# Patient Record
Sex: MALE | Race: NATIVE HAWAIIAN OR OTHER PACIFIC ISLANDER | NOT HISPANIC OR LATINO | Employment: OTHER | URBAN - METROPOLITAN AREA
[De-identification: names, ages, dates, MRNs, and addresses within clinical notes are randomized per-mention and may not be internally consistent; named-entity substitution may affect disease eponyms.]

---

## 2024-04-11 ENCOUNTER — MEDICAL CORRESPONDENCE (OUTPATIENT)
Dept: HEALTH INFORMATION MANAGEMENT | Facility: CLINIC | Age: 59
End: 2024-04-11

## 2024-04-12 ENCOUNTER — TRANSCRIBE ORDERS (OUTPATIENT)
Dept: OTHER | Age: 59
End: 2024-04-12

## 2024-04-12 DIAGNOSIS — M79.661 BILATERAL CALF PAIN: ICD-10-CM

## 2024-04-12 DIAGNOSIS — M79.662 BILATERAL CALF PAIN: ICD-10-CM

## 2024-04-12 DIAGNOSIS — R94.131 ABNORMAL EMG: Primary | ICD-10-CM

## 2024-07-09 ENCOUNTER — MEDICAL CORRESPONDENCE (OUTPATIENT)
Dept: HEALTH INFORMATION MANAGEMENT | Facility: CLINIC | Age: 59
End: 2024-07-09

## 2024-07-10 ENCOUNTER — TRANSCRIBE ORDERS (OUTPATIENT)
Dept: OTHER | Age: 59
End: 2024-07-10

## 2024-07-10 DIAGNOSIS — M21.379 FOOT DROP, UNSPECIFIED LATERALITY: ICD-10-CM

## 2024-07-10 DIAGNOSIS — M54.16 LUMBAR RADICULOPATHY: Primary | ICD-10-CM

## 2024-07-10 DIAGNOSIS — G57.30 PERONEAL NEUROPATHY: ICD-10-CM

## 2024-08-12 ENCOUNTER — OFFICE VISIT (OUTPATIENT)
Dept: NEUROLOGY | Facility: CLINIC | Age: 59
End: 2024-08-12
Payer: MEDICAID

## 2024-08-12 VITALS — DIASTOLIC BLOOD PRESSURE: 75 MMHG | HEART RATE: 76 BPM | SYSTOLIC BLOOD PRESSURE: 135 MMHG | OXYGEN SATURATION: 92 %

## 2024-08-12 DIAGNOSIS — M54.17 LUMBOSACRAL RADICULOPATHY AT L5: Primary | ICD-10-CM

## 2024-08-12 DIAGNOSIS — M54.16 LUMBAR RADICULOPATHY: ICD-10-CM

## 2024-08-12 PROBLEM — I10 HYPERTENSION: Status: ACTIVE | Noted: 2023-11-24

## 2024-08-12 PROBLEM — E11.8 CONTROLLED TYPE 2 DIABETES MELLITUS WITH COMPLICATION, WITH LONG-TERM CURRENT USE OF INSULIN (H): Status: ACTIVE | Noted: 2021-04-09

## 2024-08-12 PROBLEM — N18.1 CKD (CHRONIC KIDNEY DISEASE) STAGE 1, GFR 90 ML/MIN OR GREATER: Status: ACTIVE | Noted: 2024-01-17

## 2024-08-12 PROBLEM — M54.10 BILATERAL RADIATING LEG PAIN: Status: ACTIVE | Noted: 2023-11-22

## 2024-08-12 PROBLEM — Z79.4 CONTROLLED TYPE 2 DIABETES MELLITUS WITH COMPLICATION, WITH LONG-TERM CURRENT USE OF INSULIN (H): Status: ACTIVE | Noted: 2021-04-09

## 2024-08-12 PROBLEM — R80.9 PROTEINURIA: Status: ACTIVE | Noted: 2023-11-24

## 2024-08-12 PROCEDURE — 99205 OFFICE O/P NEW HI 60 MIN: CPT | Performed by: STUDENT IN AN ORGANIZED HEALTH CARE EDUCATION/TRAINING PROGRAM

## 2024-08-12 RX ORDER — HYDROCHLOROTHIAZIDE 25 MG/1
25 TABLET ORAL DAILY
COMMUNITY
Start: 2024-06-13

## 2024-08-12 RX ORDER — LOSARTAN POTASSIUM 50 MG/1
50 TABLET ORAL DAILY
COMMUNITY
Start: 2024-06-13

## 2024-08-12 RX ORDER — ACETAMINOPHEN 325 MG/1
325 TABLET ORAL ONCE
COMMUNITY

## 2024-08-12 RX ORDER — HYDROXYZINE PAMOATE 50 MG/1
50 CAPSULE ORAL 3 TIMES DAILY PRN
COMMUNITY
Start: 2023-08-18

## 2024-08-12 RX ORDER — ALBUTEROL SULFATE 90 UG/1
1 AEROSOL, METERED RESPIRATORY (INHALATION) EVERY 4 HOURS PRN
COMMUNITY
Start: 2024-02-23

## 2024-08-12 RX ORDER — LANCETS
EACH MISCELLANEOUS DAILY
COMMUNITY
Start: 2024-02-23

## 2024-08-12 RX ORDER — SEMAGLUTIDE 0.68 MG/ML
0.5 INJECTION, SOLUTION SUBCUTANEOUS
COMMUNITY
Start: 2023-06-15

## 2024-08-12 RX ORDER — ATORVASTATIN CALCIUM 20 MG/1
20 TABLET, FILM COATED ORAL DAILY
COMMUNITY
Start: 2024-06-13

## 2024-08-12 RX ORDER — IPRATROPIUM BROMIDE AND ALBUTEROL SULFATE 2.5; .5 MG/3ML; MG/3ML
1 SOLUTION RESPIRATORY (INHALATION) EVERY 4 HOURS PRN
COMMUNITY
Start: 2024-06-13

## 2024-08-12 ASSESSMENT — PATIENT HEALTH QUESTIONNAIRE - PHQ9: SUM OF ALL RESPONSES TO PHQ QUESTIONS 1-9: 13

## 2024-08-12 NOTE — LETTER
8/12/2024      Varun Godinez  [address]  [facility], [place] [address]      Dear Colleague,    Thank you for referring your patient, Varun Godinez, to the [facility]UROLOG[facility]. Please see a copy of my visit note below.    CHIEF COMPLAINT / REASON FOR VISIT  Bilateral leg pain    Referred by Dr. Carty (Neurology in [place])    HISTORY OF PRESENT ILLNESS   is a 58 year old male presenting [facility] for evaluation of bilateral leg pain. Patient is accompanied by his wife, who helped provide collateral history today.  He was evaluated by multiple neurologists locally.  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  He is here for second opinion.    He reports having intermittent episodes of shooting pain in the posterolateral calf, lateral thigh, and buttock for about 5 to 6 years.  Symptoms initially started in the right leg and eventually involved the left leg 6 months later (in a similar distribution).  The pain typically comes on with prolonged standing and walking. The pain is also present while lying down in certain position. Adjusting the position of his legs can make the pain go away.  Symptoms have become more frequent and more intense in the past few months.  Symptoms continue to be bilateral but worse on the right.  He also reports tingling sensation in the right leg (entire leg).  He denies any weakness.  He continues to be able to walk independently.  He has had epidural injections, which have helped decreasing the pain. However, the symptoms have not entirely gone away.  He occasionally takes Tylenol, which also helps.    Mr. Godinez denies history of significant weight loss.  His diabetes has been well-controlled.  There has been no significant change in his diabetic treatment.    I have reviewed prior investigations as listed below.  - EMG 11/22/2023, [facility] (Tabulated data from EMG report were personally  reviewed and independently interpreted): Right sural snap was 9.9  V.  Right superficial peroneal SNAP was absent.  Right peroneal CMAP was 4.1 mV with mild slowing of CV (39 m/s). Stimulation at below fibula head was not performed; no clear conduction block between ankle and pop ousmane.  Left tibial CMAP was 1.4 and right tibial CMAP was 3.7 mV (slight asymmetry).  Needle EMG in the right leg showed large motor unit potentials in right proximal and distal L5 innervated muscles, left tibialis anterior as well as right lumbar paraspinal.  Fibrillation potentials were noted in bilateral tibialis anterior and right lumbar paraspinal muscles.  Findings are consistent with chronic active bilateral L5 radiculopathies.  - Repeat EMG (no nerve conduction) 4/8/2024 showed fibrillation potentials and bilateral tibialis anterior and bilateral peroneus longus muscles.  -MRI L spine 1/17/2024:  Degenerative changes most notable at L4-L5 with mild/moderate neural foraminal stenosis bilaterally at this level.   - MRI right knee 3/15/2024:   1.  Common peroneal nerve is unremarkable by MRI appearance.   2.  Intrasubstance degenerative change in the posterior horn of the medial meniscus without tear.   3.  Moderate fissuring of the articular cartilage in the caudal aspect the mid trochlear groove.   4.  Mild tendinosis of the patellar tendon.   - MRI right hip 3/15/2024:  1.  Mild degenerative tearing in the anterior superior aspect of the right hip labrum.   2.  Moderate thinning of the articular cartilage of the right hip.   3.  No MR evidence of trochanteric bursitis of the right hip.   - MRI right leg 3/15/2024: Mild edema in the distal aspect of the gastrocnemius musculature which could represent mild strain in the appropriate clinical setting. Otherwise unremarkable exam.     REVIEW OF SYSTEMS  All negative except for what indicated in the HPI. The following portions of the patient's history were reviewed and updated as  appropriate: allergies, current medications, family history, medical history, surgical history, social history, and problem list.     PAST MEDICAL/SURGICAL HISTORY   Diabetes, hypertension, hyperlipidemia, obstructive sleep apnea.    PHYSICAL EXAM  /75   Pulse 76   SpO2 92%     NEUROLOGICAL EXAMINATION  Mental status: normal.  Cranial nerves: normal.  Muscle strength: Very mild weakness in bilateral gluteus medius and left tibialis posterior. Otherwise, normal muscle strength 5/5 proximally and distally in upper and lower limbs.  Sensation: Minimally decreased sensation to light touch, pinprick, cold temperature in the lateral>medial sides of distal legs and dorsum of both feet. Absent vibration sense in the toes.   Deep tendon reflexes: Normal reflexes in the upper limbs and patella. Absent bilateral ankle reflexes  High arched feet: Absent  Hammertoes: Absent  Coordination: normal rapid alternating movements and finger to nose testing  Gait: normal.  Walk on heels: yes, bilaterally.  Walk on toes: yes, bilaterally.    Getting up from seated position without pushing on chair: yes.  Posture: normal.  Romberg: negative.    ASSESSMENT / PLAN  #1 Chronic bilateral L5 radiculopathy  #2 Frequent radiating pain secondary to #1  #3 Diabetes mellitus, well-controlled     Mr. Godinez's reported distributions of radiating pain and sensory impairment are most consistent with bilateral L5 dermatomes. Exam also showed mild weakness in bilateral gluteus medius and left tibialis posterior (L5-innervated muscles) and absent bilateral ankle reflexes. Overall, clinical presentation and exam findings are suggestive of chronic bilateral L5 radiculopathy. This correlates well with the level of most significant degenerative changes and neural foraminal stenosis on MRI. I do not see any strong evidence of a superimposed right peroneal neuropathy on exam, prior EMG, or MRI of the knee. Given he has had some improvement with  epidural injections, I recommend continuing with conservative approaches. He will benefit from physical therapy. Patient and wife agree with the above plans. A referral to local PT was placed. He will follow-up with neurologist locally. He is 9-hour drive away from this clinic. If symptoms worsen, a referral to spine surgeon should be considered.     I spent a total of 60 minutes on the day of the visit for chart review, face-to-face visit, counseling/coordination of care, and documentation. Please see the note for further information on patient assessment and treatment.       PATIENT EDUCATION  Ready to learn, no apparent learning barriers were identified; learning preferences include listening.  Explained diagnosis and treatment plan; patient expressed understanding of the content.      Again, thank you for allowing me to participate in the care of your patient.        Sincerely,        Tsering Paz MD

## 2024-08-12 NOTE — PROGRESS NOTES
CHIEF COMPLAINT / REASON FOR VISIT  Bilateral leg pain    Referred by Dr. Carty (Neurology in [place])    HISTORY OF PRESENT ILLNESS   is a 58 year old male presenting [facility] evaluation of bilateral leg pain. Patient is accompanied by his wife, who helped provide collateral history today.  He was evaluated by multiple neurologists locally.  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  He is here for second opinion.    He reports having intermittent episodes of shooting pain in the posterolateral calf, lateral thigh, and buttock for about 5 to 6 years.  Symptoms initially started in the right leg and eventually involved the left leg 6 months later (in a similar distribution).  The pain typically comes on with prolonged standing and walking. The pain is also present while lying down in certain position. Adjusting the position of his legs can make the pain go away.  Symptoms have become more frequent and more intense in the past few months.  Symptoms continue to be bilateral but worse on the right.  He also reports tingling sensation in the right leg (entire leg).  He denies any weakness.  He continues to be able to walk independently.  He has had epidural injections, which have helped decreasing the pain. However, the symptoms have not entirely gone away.  He occasionally takes Tylenol, which also helps.    Mr. Godinez denies history of significant weight loss.  His diabetes has been well-controlled.  There has been no significant change in his diabetic treatment.    I have reviewed prior investigations as listed below.  - EMG 11/22/2023, [facility] (Tabulated data from EMG report were personally reviewed and independently interpreted): Right sural snap was 9.9  V.  Right superficial peroneal SNAP was absent.  Right peroneal CMAP was 4.1 mV with mild slowing of CV (39 m/s). Stimulation at below fibula head was not performed; no clear conduction block between  ankle and pop ousmane.  Left tibial CMAP was 1.4 and right tibial CMAP was 3.7 mV (slight asymmetry).  Needle EMG in the right leg showed large motor unit potentials in right proximal and distal L5 innervated muscles, left tibialis anterior as well as right lumbar paraspinal.  Fibrillation potentials were noted in bilateral tibialis anterior and right lumbar paraspinal muscles.  Findings are consistent with chronic active bilateral L5 radiculopathies.  - Repeat EMG (no nerve conduction) 4/8/2024 showed fibrillation potentials and bilateral tibialis anterior and bilateral peroneus longus muscles.  -MRI L spine 1/17/2024:  Degenerative changes most notable at L4-L5 with mild/moderate neural foraminal stenosis bilaterally at this level.   - MRI right knee 3/15/2024:   1.  Common peroneal nerve is unremarkable by MRI appearance.   2.  Intrasubstance degenerative change in the posterior horn of the medial meniscus without tear.   3.  Moderate fissuring of the articular cartilage in the caudal aspect the mid trochlear groove.   4.  Mild tendinosis of the patellar tendon.   - MRI right hip 3/15/2024:  1.  Mild degenerative tearing in the anterior superior aspect of the right hip labrum.   2.  Moderate thinning of the articular cartilage of the right hip.   3.  No MR evidence of trochanteric bursitis of the right hip.   - MRI right leg 3/15/2024: Mild edema in the distal aspect of the gastrocnemius musculature which could represent mild strain in the appropriate clinical setting. Otherwise unremarkable exam.     REVIEW OF SYSTEMS  All negative except for what indicated in the HPI. The following portions of the patient's history were reviewed and updated as appropriate: allergies, current medications, family history, medical history, surgical history, social history, and problem list.     PAST MEDICAL/SURGICAL HISTORY   Diabetes, hypertension, hyperlipidemia, obstructive sleep apnea.    PHYSICAL EXAM  /75   Pulse 76    SpO2 92%     NEUROLOGICAL EXAMINATION  Mental status: normal.  Cranial nerves: normal.  Muscle strength: Very mild weakness in bilateral gluteus medius and left tibialis posterior. Otherwise, normal muscle strength 5/5 proximally and distally in upper and lower limbs.  Sensation: Minimally decreased sensation to light touch, pinprick, cold temperature in the lateral>medial sides of distal legs and dorsum of both feet. Absent vibration sense in the toes.   Deep tendon reflexes: Normal reflexes in the upper limbs and patella. Absent bilateral ankle reflexes  High arched feet: Absent  Hammertoes: Absent  Coordination: normal rapid alternating movements and finger to nose testing  Gait: normal.  Walk on heels: yes, bilaterally.  Walk on toes: yes, bilaterally.    Getting up from seated position without pushing on chair: yes.  Posture: normal.  Romberg: negative.    ASSESSMENT / PLAN  #1 Chronic bilateral L5 radiculopathy  #2 Frequent radiating pain secondary to #1  #3 Diabetes mellitus, well-controlled     Mr. Godinez's reported distributions of radiating pain and sensory impairment are most consistent with bilateral L5 dermatomes. Exam also showed mild weakness in bilateral gluteus medius and left tibialis posterior (L5-innervated muscles) and absent bilateral ankle reflexes. Overall, clinical presentation and exam findings are suggestive of chronic bilateral L5 radiculopathy. This correlates well with the level of most significant degenerative changes and neural foraminal stenosis on MRI. I do not see any strong evidence of a superimposed right peroneal neuropathy on exam, prior EMG, or MRI of the knee. Given he has had some improvement with epidural injections, I recommend continuing with conservative approaches. He will benefit from physical therapy. Patient and wife agree with the above plans. A referral to local PT was placed. He will follow-up with neurologist locally. He is 9-hour drive away from this clinic.  If symptoms worsen, a referral to spine surgeon should be considered.     I spent a total of 60 minutes on the day of the visit for chart review, face-to-face visit, counseling/coordination of care, and documentation. Please see the note for further information on patient assessment and treatment.       PATIENT EDUCATION  Ready to learn, no apparent learning barriers were identified; learning preferences include listening.  Explained diagnosis and treatment plan; patient expressed understanding of the content.

## 2024-10-06 ENCOUNTER — HEALTH MAINTENANCE LETTER (OUTPATIENT)
Age: 59
End: 2024-10-06

## 2025-01-19 ENCOUNTER — HEALTH MAINTENANCE LETTER (OUTPATIENT)
Age: 60
End: 2025-01-19

## 2025-04-27 ENCOUNTER — HEALTH MAINTENANCE LETTER (OUTPATIENT)
Age: 60
End: 2025-04-27

## 2025-08-10 ENCOUNTER — HEALTH MAINTENANCE LETTER (OUTPATIENT)
Age: 60
End: 2025-08-10